# Patient Record
Sex: MALE | Race: BLACK OR AFRICAN AMERICAN | NOT HISPANIC OR LATINO | Employment: UNEMPLOYED | URBAN - METROPOLITAN AREA
[De-identification: names, ages, dates, MRNs, and addresses within clinical notes are randomized per-mention and may not be internally consistent; named-entity substitution may affect disease eponyms.]

---

## 2020-05-20 ENCOUNTER — HOSPITAL ENCOUNTER (EMERGENCY)
Facility: CLINIC | Age: 50
Discharge: HOME | End: 2020-05-21
Attending: EMERGENCY MEDICINE | Admitting: EMERGENCY MEDICINE
Payer: MEDICAID

## 2020-05-20 DIAGNOSIS — R03.0 ELEVATED BP WITHOUT DIAGNOSIS OF HYPERTENSION: ICD-10-CM

## 2020-05-20 DIAGNOSIS — R94.31 NONSPECIFIC ABNORMAL ELECTROCARDIOGRAM (ECG) (EKG): ICD-10-CM

## 2020-05-20 LAB
ANION GAP SERPL CALCULATED.3IONS-SCNC: 6 MMOL/L (ref 3–14)
BASOPHILS # BLD AUTO: 0.1 10E9/L (ref 0–0.2)
BASOPHILS NFR BLD AUTO: 0.6 %
BUN SERPL-MCNC: 11 MG/DL (ref 7–30)
CALCIUM SERPL-MCNC: 9.2 MG/DL (ref 8.5–10.1)
CHLORIDE SERPL-SCNC: 107 MMOL/L (ref 94–109)
CO2 SERPL-SCNC: 24 MMOL/L (ref 20–32)
CREAT SERPL-MCNC: 0.87 MG/DL (ref 0.66–1.25)
DIFFERENTIAL METHOD BLD: ABNORMAL
EOSINOPHIL # BLD AUTO: 0.4 10E9/L (ref 0–0.7)
EOSINOPHIL NFR BLD AUTO: 4.7 %
ERYTHROCYTE [DISTWIDTH] IN BLOOD BY AUTOMATED COUNT: 15.3 % (ref 10–15)
GFR SERPL CREATININE-BSD FRML MDRD: >90 ML/MIN/{1.73_M2}
GLUCOSE SERPL-MCNC: 234 MG/DL (ref 70–99)
HCT VFR BLD AUTO: 43.1 % (ref 40–53)
HGB BLD-MCNC: 13.2 G/DL (ref 13.3–17.7)
IMM GRANULOCYTES # BLD: 0.1 10E9/L (ref 0–0.4)
IMM GRANULOCYTES NFR BLD: 0.6 %
LYMPHOCYTES # BLD AUTO: 3.1 10E9/L (ref 0.8–5.3)
LYMPHOCYTES NFR BLD AUTO: 34.1 %
MCH RBC QN AUTO: 22.2 PG (ref 26.5–33)
MCHC RBC AUTO-ENTMCNC: 30.6 G/DL (ref 31.5–36.5)
MCV RBC AUTO: 72 FL (ref 78–100)
MONOCYTES # BLD AUTO: 0.8 10E9/L (ref 0–1.3)
MONOCYTES NFR BLD AUTO: 8.9 %
NEUTROPHILS # BLD AUTO: 4.6 10E9/L (ref 1.6–8.3)
NEUTROPHILS NFR BLD AUTO: 51.1 %
NRBC # BLD AUTO: 0 10*3/UL
NRBC BLD AUTO-RTO: 0 /100
PLATELET # BLD AUTO: 303 10E9/L (ref 150–450)
POTASSIUM SERPL-SCNC: 4 MMOL/L (ref 3.4–5.3)
RBC # BLD AUTO: 5.95 10E12/L (ref 4.4–5.9)
SODIUM SERPL-SCNC: 137 MMOL/L (ref 133–144)
TROPONIN I SERPL-MCNC: <0.015 UG/L (ref 0–0.04)
WBC # BLD AUTO: 9.1 10E9/L (ref 4–11)

## 2020-05-20 PROCEDURE — 80048 BASIC METABOLIC PNL TOTAL CA: CPT | Performed by: EMERGENCY MEDICINE

## 2020-05-20 PROCEDURE — 99285 EMERGENCY DEPT VISIT HI MDM: CPT

## 2020-05-20 PROCEDURE — 93005 ELECTROCARDIOGRAM TRACING: CPT

## 2020-05-20 PROCEDURE — 84484 ASSAY OF TROPONIN QUANT: CPT | Performed by: EMERGENCY MEDICINE

## 2020-05-20 PROCEDURE — 85025 COMPLETE CBC W/AUTO DIFF WBC: CPT | Performed by: EMERGENCY MEDICINE

## 2020-05-20 PROCEDURE — 93005 ELECTROCARDIOGRAM TRACING: CPT | Mod: 76

## 2020-05-20 ASSESSMENT — ENCOUNTER SYMPTOMS
CHEST TIGHTNESS: 0
SHORTNESS OF BREATH: 0

## 2020-05-20 ASSESSMENT — MIFFLIN-ST. JEOR: SCORE: 2184.41

## 2020-05-20 NOTE — ED AVS SNAPSHOT
[facility] Emergency Department  [address]  [facility] [address]  Phone:  [phone]  Fax:  [phone]                                    Dayan Rodriguez   MRN: [id number]    Department:  [facility] Emergency Department   Date of Visit:  5/20/2020           After Visit Summary Signature Page    I have received my discharge instructions, and my questions have been answered. I have discussed any challenges I see with this plan with the nurse or doctor.    ..........................................................................................................................................  Patient/Patient Representative Signature      ..........................................................................................................................................  Patient Representative Print Name and Relationship to Patient    ..................................................               ................................................  Date                                   Time    ..........................................................................................................................................  Reviewed by Signature/Title    ...................................................              ..............................................  Date                                               Time          22EPIC Rev 08/18

## 2020-05-21 VITALS
RESPIRATION RATE: 18 BRPM | WEIGHT: 272 LBS | HEIGHT: 75 IN | SYSTOLIC BLOOD PRESSURE: 145 MMHG | OXYGEN SATURATION: 100 % | BODY MASS INDEX: 33.82 KG/M2 | HEART RATE: 73 BPM | TEMPERATURE: 98.4 F | DIASTOLIC BLOOD PRESSURE: 106 MMHG

## 2020-05-21 LAB
INTERPRETATION ECG - MUSE: NORMAL
INTERPRETATION ECG - MUSE: NORMAL
TROPONIN I SERPL-MCNC: <0.015 UG/L (ref 0–0.04)

## 2020-05-21 NOTE — ED TRIAGE NOTES
Presents with concerns of hypertension. States 208/140 at 2100 at home. Denies any symptoms. A&O x 4, ABC's intact.

## 2020-05-21 NOTE — ED PROVIDER NOTES
History     Chief Complaint:  Hypertension    HPI   Dayan Rodriguez is a 49 year old male who presents to the emergency department for evaluation of hypertension. The patient went for a job interview today and had a physical exam performed where his blood pressure was taken 3 times and elevated each time at 176/101, 168/88, 188/106. He took his blood pressure again this evening and had a reading of 208/140, prompting his visit to the emergency department tonight.  He has a blood pressure cuff at home and typically gets readings of 150-160/. He does not regularly follow with a primary care provider. He denies chest pain, chest pressure, or shortness of breath.  He denies vision changes, headache, slurred speech, nor focal neurologic deficits.  No other concerns are voiced at this time.    CARDIAC RISK FACTORS:  Sex:    male  Tobacco:   no  Hypertension:   yes  Hyperlipidemia:  no  Diabetes:   no  Family History:  yes    PE/DVT RISK FACTORS:  Sex:    male  Hormones:   no  Tobacco:   no  Cancer:   no  Travel:   no  Surgery:   no  Other immobilization: no  Personal history:  no  Family history:  no    Allergies:  No Known Drug Allergies     Medications:    The patient is not currently taking any prescribed medications.    Past Medical History:    The patient denies any significant past medical history.    Past Surgical History:    The patient does not have any pertinent past surgical history.    Family History:  Parent  of stroke in 50s.    Social History:  Smoking Status: Former Smoker, quit 5 yrs prior  Smokeless Tobacco: Never Used  Alcohol Use: Yes, socially and rare  Drug Use: No     Review of Systems   Respiratory: Negative for chest tightness and shortness of breath.    Cardiovascular: Negative for chest pain.   All other systems reviewed and are negative.      Physical Exam     Patient Vitals for the past 24 hrs:   BP Temp Temp src Pulse Heart Rate Resp SpO2 Height Weight   20 2230 (!) 149/103 --  "-- 77 77 -- 100 % -- --   05/20/20 2215 (!) 152/101 -- -- 76 78 -- 100 % -- --   05/20/20 2200 (!) 156/102 -- -- 76 80 -- 100 % -- --   05/20/20 2145 (!) 163/98 -- -- 74 76 -- 100 % -- --   05/20/20 2128 (!) 171/114 98.4  F (36.9  C) Oral 73 73 18 98 % 1.905 m (6' 3\") 123.4 kg (272 lb)       Physical Exam    General:              Well-nourished              Speaking in full sentences    Well appearing, resting comfortably on gurney  Eyes:              Conjunctiva without injection or scleral icterus  ENT:              Moist mucous membranes              Nares patent              Pinnae normal  Neck:              Full ROM              No stiffness appreciated  Resp:              Lungs CTAB              No crackles, wheezing or audible rubs              Good air movement  CV:                    Normal rate, regular rhythm              S1 and S2 present              No murmur, gallop or rub  GI:              BS present              Abdomen soft without distention              Non-tender to light and deep palpation              No guarding or rebound tenderness  Skin:              Warm, dry, well perfused              No rashes or open wounds on exposed skin  MSK:              Moves all extremities              No focal deformities or swelling  Neuro:              Alert              Answers questions appropriately              Moves all extremities equally              Gait stable  Psych:              Normal affect, normal mood    Emergency Department Course     ECG:  ECG taken at 2212, ECG read at 2213  Normal sinus rhythm  Nonspecific T wave abnormality   Abnormal ECG  Rate 72 bpm. VT interval 182 ms. QRS duration 100 ms. QT/QTc 356/389 ms. P-R-T axes 35 -5 33.    ECG:  ECG taken at 2257, ECG read at 2258  Sinus rhythm  Non-specific intra-ventricular conduction delay  Nonspecific T wave abnormality  Abnormal ECG  No significant change compared to EKG dated 05/20/2020.    Rate 64 bpm. VT interval 186 ms. QRS duration " 122 ms. QT/QTc 364/375 ms. P-R-T axes 30 -13 32.    Laboratory:  Laboratory findings were communicated with the patient who voiced understanding of the findings.    Troponin(Collected 21:44): <0.015  Troponin(Collected 23:47): <0.015    BMP: Glucose 234 (H), o/w WNL (Creatinine: 0.87)    CBC: WBC: 9.1, HGB: 13.2 (L), PLT: 303     Emergency Department Course:  Past medical records, nursing notes, and vitals reviewed.    2134 I performed an exam of the patient as documented above.     2212 EKG obtained in the ED, see results above.     2144 IV was inserted and blood was drawn for laboratory testing, results above.    2332 I consulted with Dr. Welsh, with cardiology, regarding the patient's history and presentation here in the emergency department.     2345 I rechecked the patient and discussed the results of his workup thus far.     Findings and plan explained to the Patient. Patient discharged home with instructions regarding supportive care, medications, and reasons to return. The importance of close follow-up was reviewed.     I personally reviewed the laboratory and imaging results with the Patient and answered all related questions prior to discharge.     Impression & Plan     Medical Decision Making:  Dayan Rodriguez is a 49-year-old male presenting to the emergency department with concerns regarding high blood pressure.  VS on presentation reveal a BP of 171/114, which improved during his ED course without need for medication intervention.  By history, patient denies experiencing symptoms suggestive of endorgan damage.  Specifically, he denies headache, confusion, visual changes, or focal neurologic deficits to suggest cerebral ischemia or hypertensive encephalopathy.  Additionally, he denies symptoms of chest pain, chest pressure, nor shortness of breath to suggest ACS.  Screening EKG was performed, demonstrating mild ST segment elevation in lead V2, as well as T wave inversion in the lateral precordial leads.   No previous EKGs are available for comparison.  No significant changes are noted on repeat EKG during his ED course.  In light of these findings, I reviewed the EKGs with Dr. Welsh of cardiology.  He did not feel this represented acute ischemia, especially in the setting of no symptoms and negative troponin testing.  He felt this was most likely related to findings of underlying hypertension.  Patient's labs otherwise reveal unremarkable renal function.  Blood sugar is elevated, though this was not a fasting sample and patient notes eating at [employer] just prior to coming to the ED.  I stressed the importance of close follow-up and establishing care with a primary care provider.  Referral information was provided.  The patient was encouraged to keep a log of blood pressure readings at home to follow-up with his PCP and determine if medication intervention is required.  Given his improvements in blood pressure during his ED course, I do not feel this needs to be initiated emergently from the ED.  Patient verbalized understanding of this.  He is encouraged to monitor symptoms closely and return immediately should he develop chest pain, chest pressure, shortness of breath, persistently elevated BP >180/100 or any other new or troubling symptoms. We reviewed follow-up plan with wife prior to discharge, and patient does have an appointment already scheduled with a PCP tomorrow at 11:30 am.  All questions were answered prior to discharge.    Diagnosis:    ICD-10-CM    1. Elevated BP without diagnosis of hypertension  R03.0    2. Nonspecific abnormal electrocardiogram (ECG) (EKG)  R94.31        Disposition:  Discharged to home.    Scribe Disclosure:  Charlotte HOLM, am serving as a scribe at 9:40 PM on 5/20/2020 to document services personally performed by Angel Sepulveda MD based on my observations and the provider's statements to me.        Angel Sepulveda MD  05/21/20 [id number]

## 2022-11-30 ENCOUNTER — HOSPITAL ENCOUNTER (EMERGENCY)
Facility: CLINIC | Age: 52
Discharge: HOME | End: 2022-11-30
Attending: EMERGENCY MEDICINE | Admitting: EMERGENCY MEDICINE
Payer: COMMERCIAL

## 2022-11-30 ENCOUNTER — APPOINTMENT (OUTPATIENT)
Dept: CT IMAGING | Facility: CLINIC | Age: 52
End: 2022-11-30
Attending: EMERGENCY MEDICINE
Payer: COMMERCIAL

## 2022-11-30 VITALS
DIASTOLIC BLOOD PRESSURE: 105 MMHG | TEMPERATURE: 99.1 F | BODY MASS INDEX: 32.25 KG/M2 | SYSTOLIC BLOOD PRESSURE: 158 MMHG | RESPIRATION RATE: 22 BRPM | HEART RATE: 78 BPM | OXYGEN SATURATION: 95 % | WEIGHT: 258 LBS

## 2022-11-30 DIAGNOSIS — J98.4 INFLAMMATION OF LUNG: ICD-10-CM

## 2022-11-30 DIAGNOSIS — J18.9 ATYPICAL PNEUMONIA: ICD-10-CM

## 2022-11-30 LAB
ALBUMIN UR-MCNC: NEGATIVE MG/DL
ANION GAP SERPL CALCULATED.3IONS-SCNC: 11 MMOL/L (ref 7–15)
APPEARANCE UR: CLEAR
ATRIAL RATE - MUSE: 93 BPM
BACTERIA SPT CULT: NORMAL
BASE EXCESS BLDV CALC-SCNC: 3.9 MMOL/L (ref -7.7–1.9)
BASOPHILS # BLD AUTO: 0.1 10E3/UL (ref 0–0.2)
BASOPHILS NFR BLD AUTO: 1 %
BILIRUB UR QL STRIP: NEGATIVE
BUN SERPL-MCNC: 11.4 MG/DL (ref 6–20)
CALCIUM SERPL-MCNC: 9.9 MG/DL (ref 8.6–10)
CHLORIDE SERPL-SCNC: 97 MMOL/L (ref 98–107)
COLOR UR AUTO: ABNORMAL
CREAT SERPL-MCNC: 1.34 MG/DL (ref 0.67–1.17)
DEPRECATED HCO3 PLAS-SCNC: 27 MMOL/L (ref 22–29)
DIASTOLIC BLOOD PRESSURE - MUSE: NORMAL MMHG
EOSINOPHIL # BLD AUTO: 1.2 10E3/UL (ref 0–0.7)
EOSINOPHIL NFR BLD AUTO: 12 %
ERYTHROCYTE [DISTWIDTH] IN BLOOD BY AUTOMATED COUNT: 14 % (ref 10–15)
FLUAV RNA SPEC QL NAA+PROBE: NEGATIVE
FLUBV RNA RESP QL NAA+PROBE: NEGATIVE
GFR SERPL CREATININE-BSD FRML MDRD: 64 ML/MIN/1.73M2
GLUCOSE SERPL-MCNC: 168 MG/DL (ref 70–99)
GLUCOSE UR STRIP-MCNC: NEGATIVE MG/DL
GRAM STAIN RESULT: NORMAL
HCO3 BLDV-SCNC: 29 MMOL/L (ref 21–28)
HCT VFR BLD AUTO: 40.2 % (ref 40–53)
HGB BLD-MCNC: 12.4 G/DL (ref 13.3–17.7)
HGB UR QL STRIP: ABNORMAL
IMM GRANULOCYTES # BLD: 0.1 10E3/UL
IMM GRANULOCYTES NFR BLD: 1 %
INTERPRETATION ECG - MUSE: NORMAL
KETONES UR STRIP-MCNC: NEGATIVE MG/DL
L PNEUMO1 AG UR QL IA: NEGATIVE
LEUKOCYTE ESTERASE UR QL STRIP: ABNORMAL
LYMPHOCYTES # BLD AUTO: 1.6 10E3/UL (ref 0.8–5.3)
LYMPHOCYTES NFR BLD AUTO: 15 %
MCH RBC QN AUTO: 22.6 PG (ref 26.5–33)
MCHC RBC AUTO-ENTMCNC: 30.8 G/DL (ref 31.5–36.5)
MCV RBC AUTO: 73 FL (ref 78–100)
MONOCYTES # BLD AUTO: 0.7 10E3/UL (ref 0–1.3)
MONOCYTES NFR BLD AUTO: 7 %
NEUTROPHILS # BLD AUTO: 6.6 10E3/UL (ref 1.6–8.3)
NEUTROPHILS NFR BLD AUTO: 64 %
NITRATE UR QL: NEGATIVE
NRBC # BLD AUTO: 0 10E3/UL
NRBC BLD AUTO-RTO: 0 /100
NT-PROBNP SERPL-MCNC: 6 PG/ML (ref 0–900)
O2/TOTAL GAS SETTING VFR VENT: 0 %
P AXIS - MUSE: 25 DEGREES
PCO2 BLDV: 45 MM HG (ref 40–50)
PH BLDV: 7.42 [PH] (ref 7.32–7.43)
PH UR STRIP: 5.5 [PH] (ref 5–7)
PLATELET # BLD AUTO: 313 10E3/UL (ref 150–450)
PO2 BLDV: 43 MM HG (ref 25–47)
POTASSIUM SERPL-SCNC: 3.6 MMOL/L (ref 3.4–5.3)
PR INTERVAL - MUSE: 164 MS
QRS DURATION - MUSE: 106 MS
QT - MUSE: 394 MS
QTC - MUSE: 489 MS
R AXIS - MUSE: -30 DEGREES
RBC # BLD AUTO: 5.49 10E6/UL (ref 4.4–5.9)
RBC URINE: 1 /HPF
RSV RNA SPEC NAA+PROBE: NEGATIVE
SARS-COV-2 RNA RESP QL NAA+PROBE: NEGATIVE
SODIUM SERPL-SCNC: 135 MMOL/L (ref 136–145)
SP GR UR STRIP: 1 (ref 1–1.03)
SYSTOLIC BLOOD PRESSURE - MUSE: NORMAL MMHG
T AXIS - MUSE: 25 DEGREES
TROPONIN T SERPL HS-MCNC: 19 NG/L
UROBILINOGEN UR STRIP-MCNC: NORMAL MG/DL
VENTRICULAR RATE- MUSE: 93 BPM
WBC # BLD AUTO: 10.2 10E3/UL (ref 4–11)
WBC URINE: 2 /HPF

## 2022-11-30 PROCEDURE — 87070 CULTURE OTHR SPECIMN AEROBIC: CPT | Performed by: EMERGENCY MEDICINE

## 2022-11-30 PROCEDURE — 85014 HEMATOCRIT: CPT | Performed by: EMERGENCY MEDICINE

## 2022-11-30 PROCEDURE — 36415 COLL VENOUS BLD VENIPUNCTURE: CPT | Performed by: EMERGENCY MEDICINE

## 2022-11-30 PROCEDURE — 71250 CT THORAX DX C-: CPT

## 2022-11-30 PROCEDURE — 87637 SARSCOV2&INF A&B&RSV AMP PRB: CPT | Performed by: EMERGENCY MEDICINE

## 2022-11-30 PROCEDURE — 99285 EMERGENCY DEPT VISIT HI MDM: CPT | Mod: 25

## 2022-11-30 PROCEDURE — 87086 URINE CULTURE/COLONY COUNT: CPT | Performed by: EMERGENCY MEDICINE

## 2022-11-30 PROCEDURE — 81001 URINALYSIS AUTO W/SCOPE: CPT | Performed by: EMERGENCY MEDICINE

## 2022-11-30 PROCEDURE — 80048 BASIC METABOLIC PNL TOTAL CA: CPT | Performed by: EMERGENCY MEDICINE

## 2022-11-30 PROCEDURE — 96374 THER/PROPH/DIAG INJ IV PUSH: CPT

## 2022-11-30 PROCEDURE — 82803 BLOOD GASES ANY COMBINATION: CPT | Performed by: EMERGENCY MEDICINE

## 2022-11-30 PROCEDURE — 87899 AGENT NOS ASSAY W/OPTIC: CPT | Performed by: EMERGENCY MEDICINE

## 2022-11-30 PROCEDURE — 83880 ASSAY OF NATRIURETIC PEPTIDE: CPT | Performed by: EMERGENCY MEDICINE

## 2022-11-30 PROCEDURE — 93005 ELECTROCARDIOGRAM TRACING: CPT

## 2022-11-30 PROCEDURE — C9803 HOPD COVID-19 SPEC COLLECT: HCPCS

## 2022-11-30 PROCEDURE — 84484 ASSAY OF TROPONIN QUANT: CPT | Performed by: EMERGENCY MEDICINE

## 2022-11-30 PROCEDURE — 250N000011 HC RX IP 250 OP 636: Performed by: EMERGENCY MEDICINE

## 2022-11-30 RX ORDER — METHYLPREDNISOLONE SODIUM SUCCINATE 125 MG/2ML
125 INJECTION, POWDER, LYOPHILIZED, FOR SOLUTION INTRAMUSCULAR; INTRAVENOUS ONCE
Status: COMPLETED | OUTPATIENT
Start: 2022-11-30 | End: 2022-11-30

## 2022-11-30 RX ORDER — AZITHROMYCIN 250 MG/1
TABLET, FILM COATED ORAL
Qty: 6 TABLET | Refills: 0 | Status: SHIPPED | OUTPATIENT
Start: 2022-11-30 | End: 2022-12-05

## 2022-11-30 RX ORDER — PREDNISONE 20 MG/1
TABLET ORAL
Qty: 10 TABLET | Refills: 0 | Status: SHIPPED | OUTPATIENT
Start: 2022-11-30

## 2022-11-30 RX ADMIN — METHYLPREDNISOLONE SODIUM SUCCINATE 125 MG: 125 INJECTION, POWDER, FOR SOLUTION INTRAMUSCULAR; INTRAVENOUS at 09:07

## 2022-11-30 ASSESSMENT — ENCOUNTER SYMPTOMS
CHILLS: 0
NAUSEA: 1
ABDOMINAL PAIN: 1
SHORTNESS OF BREATH: 1
COUGH: 1
FREQUENCY: 1
BLOOD IN STOOL: 0
VOMITING: 1
FEVER: 0

## 2022-11-30 ASSESSMENT — ACTIVITIES OF DAILY LIVING (ADL)
ADLS_ACUITY_SCORE: 35

## 2022-11-30 NOTE — Clinical Note
Dayan Rodriguez was seen and treated in our emergency department on 11/30/2022.  He may return to work on 12/03/2022.       If you have any questions or concerns, please don't hesitate to call.      Masters-Amaya Jones, RN

## 2022-11-30 NOTE — ED TRIAGE NOTES
Pt arrives via EMS, EMS reports that pt comes from work where he started having worsening SOB. PT has been on prednisone for 2.5 months due to this chronic SOB and also doing nebs at home. Pt VSS upon EMS arrival but they reported pt lung sounds were tight, 3 albuterol nebs and 1 duo neb given en route. PT reports improvement after. PT VSS and ABC's intact

## 2022-11-30 NOTE — ED PROVIDER NOTES
Signed out by Dr. Vargas, refer to his notes for details.  Medically complex patient.    Urinalysis pending.  Sputum culture should be sent.  Antibiotics to cover for groundglass opacities already prescribed.    Discussed urine findings with patient, no convincing UTI with only 1 WBC.  Culture pending due to reflex order with +LE.  Confirmed with him that sputum sample was collected.    Discharged with follow-up as discussed by Dr. Vargas.         Fannie Grubbs MD  11/30/22 2315

## 2022-11-30 NOTE — ED PROVIDER NOTES
History   Chief Complaint:  Shortness of Breath       The history is provided by the patient and the spouse.      Dayan Rodriguez is a 52 year old male, 3 months s/p renal stent replacement, with history of diabetes mellitus, hypertension, and retroperitoneal fibromatosis who presents via EMS with shortness of breath, present chronically for 2 months, worsened this morning. He was seen at Urgent Care a week ago for the same symptoms, where he was given prednisone. He also reports a productive cough, some wheezing, some abdominal pain with cough, and one bout of emesis this morning following albuterol nebulizer treatment. Reports urinary frequency of about every 15 min for the past two weeks. His wife also notes swelling of lower extremities, worse in the left leg. He reports exacerbation of shortness of breath with exertion, including walking short distances within the house, and going outside. He denies chest pain. Denies fever and chills. Denies nausea and vomiting in ED. Denies black stool or blood in stool. He denies diagnosis with asthma or bronchitis. His wife notes Hx of stomach fibroid, with prednisone treatment for last 2.5 years, ending about a month ago. She reports he is due for a renal stent replacement.     Review of Systems   Constitutional: Negative for chills and fever.   Respiratory: Positive for cough and shortness of breath.    Cardiovascular: Positive for leg swelling. Negative for chest pain.   Gastrointestinal: Positive for abdominal pain, nausea (resolved) and vomiting (x1 this AM). Negative for blood in stool.   Genitourinary: Positive for frequency.   All other systems reviewed and are negative.    Allergies:  Ibuprofen  Naproxen    Medications:  Lisinopril  Atrovent  Xonpenex  Wellbutrin XL  Norvasc  Lipitor  Revatio  Cellcept  Maxzide-25  Metformin  Bactrim D5  Prednisone  Albuterol HFA    Past Medical History:     Retroperitoneal fibromatosis  Hydronephrosis with ureteral stricture,  left  Depression and anxiety  Chronic use of systemic steroids  Fatty liver  Diabetes mellitus, type 2  Hyperlipidemia   Hypertension   ED  Arthritis of right hip  Post-traumatic osteoarthritis of left knee  Bronchospasm  Bunion, left  Hammer toe left foot  Elevated ferritin   Tinnitus  Tobacco use    Past Surgical History:    Bunionectomy  Hammer toe repair     Family History:    Father- stroke  Mother- ovarian cancer  Brother- heart disease    Social History:  The patient presents to the ED with his wife.  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  PCP: No Ref-Primary, Physician     Physical Exam     Patient Vitals for the past 24 hrs:   BP Temp Pulse Resp SpO2 Weight   11/30/22 1105 (!) 158/105 -- 78 22 95 % --   11/30/22 1050 (!) 156/104 -- 72 13 96 % --   11/30/22 1035 (!) 151/106 -- 72 14 96 % --   11/30/22 1020 (!) 155/103 -- -- -- -- --   11/30/22 1005 (!) 182/112 -- -- -- 97 % --   11/30/22 0952 (!) 177/114 -- 73 -- 97 % --   11/30/22 0914 (!) 176/107 -- -- -- 94 % --   11/30/22 0759 (!) 166/97 -- -- -- -- 117 kg (258 lb)   11/30/22 0756 -- 99.1  F (37.3  C) 91 20 97 % --       Physical Exam  General: Patient is awake, alert and interactive when I enter the room  Head: The scalp, face, and head appear normal  Eyes: The pupils are equal, round, and reactive to light. Conjunctivae and sclerae are normal  ENT: External acoustic canals are normal. The oropharynx is normal without erythema. Uvula is in the midline  Neck: Normal range of motion.   CV: Regular rate and rhythm.   Resp: wheezing in multiple lung fields, no tachypnea or increased work of breathing   GI: Abdomen is soft, no rigidity, guarding, or rebound. No distension. No tenderness to palpation in any quadrant.     MS: Normal tone. Joints grossly normal without effusions. No asymmetric leg swelling, calf or thigh tenderness.    Skin: No rash or lesions noted. Normal capillary refill noted  Neuro: Speech is  normal and fluent. Face is symmetric. Moving all extremities.   Psych:  Normal affect.  Appropriate interactions.    Emergency Department Course    ECG  ECG taken at 0805, ECG read at 0832  Normal sinus rhythm. Left axis deviation. Prolonged QT. Abnormal ECG.   No significant change as compared to prior, dated 5/20/2020.  Rate 93 bpm. WA interval 164 ms. QRS duration 106 ms. QT/QTc 394/489 ms. P-R-T axes 25 -30 25.     Imaging:  Chest CT w/o contrast   Preliminary Result   IMPRESSION:    1.  Focal groundglass consolidation at the right upper lobe is   identified. This could represent an area of atypical pneumonia versus   inflammatory lung disease. Neoplasm is not entirely excluded. See   below for follow up imaging guidelines.   2.  Bilateral peribronchial wall thickening and mucus impaction   suggesting a bronchitis.   3.  Mild coronary artery calcifications.      Nodule Type:   Solitary pure GGN = or > 6mm: CT at 6-12 months to confirm   persistence, then CT every 2 years until 5 years.      *GGN = Groundglass nodule (subsolid nodule).   *Recommendations based on Guidelines for the Management of Incidental   Pulmonary Nodules Detected at CT: From the Fleischner Society 2017,   Radiology 2017.         Report per radiology    Laboratory:  Labs Ordered and Resulted from Time of ED Arrival to Time of ED Departure   BASIC METABOLIC PANEL - Abnormal       Result Value    Sodium 135 (*)     Potassium 3.6      Chloride 97 (*)     Carbon Dioxide (CO2) 27      Anion Gap 11      Urea Nitrogen 11.4      Creatinine 1.34 (*)     Calcium 9.9      Glucose 168 (*)     GFR Estimate 64     BLOOD GAS VENOUS - Abnormal    pH Venous 7.42      pCO2 Venous 45      pO2 Venous 43      Bicarbonate Venous 29 (*)     Base Excess/Deficit (+/-) 3.9 (*)     FIO2 0     CBC WITH PLATELETS AND DIFFERENTIAL - Abnormal    WBC Count 10.2      RBC Count 5.49      Hemoglobin 12.4 (*)     Hematocrit 40.2      MCV 73 (*)     MCH 22.6 (*)     MCHC 30.8  (*)     RDW 14.0      Platelet Count 313      % Neutrophils 64      % Lymphocytes 15      % Monocytes 7      % Eosinophils 12      % Basophils 1      % Immature Granulocytes 1      NRBCs per 100 WBC 0      Absolute Neutrophils 6.6      Absolute Lymphocytes 1.6      Absolute Monocytes 0.7      Absolute Eosinophils 1.2 (*)     Absolute Basophils 0.1      Absolute Immature Granulocytes 0.1      Absolute NRBCs 0.0     TROPONIN T, HIGH SENSITIVITY - Normal    Troponin T, High Sensitivity 19     NT PROBNP INPATIENT - Normal    N terminal Pro BNP Inpatient 6     INFLUENZA A/B & SARS-COV2 PCR MULTIPLEX - Normal    Influenza A PCR Negative      Influenza B PCR Negative      RSV PCR Negative      SARS CoV2 PCR Negative     ROUTINE UA WITH MICROSCOPIC REFLEX TO CULTURE   LEGIONELLA PNEUMOPHILA URINARY ANTIGEN   RESPIRATORY AEROBIC BACTERIAL CULTURE        Emergency Department Course:    Reviewed:  I reviewed nursing notes, vitals, past medical history and Care Everywhere    Assessments:  0810 I obtained history and examined the patient as noted above.   1118 I rechecked the patient and explained findings. He was agreeable to discharge.    Consult:  Spoke with Pulmonology at the [facility] regarding presentation     Interventions:  0907 Solu-medrol 125 mg IV    Disposition:  The patient was discharged to home.     Impression & Plan   Medical Decision Making:  Dayan Rodriguez is a 52 year old male, 3 months s/p renal stent replacement, with history of diabetes mellitus, hypertension, and retroperitoneal fibromatosis who presents via EMS with shortness of breath, present chronically for 2 months, worsened this morning.  On initial evaluation, he is not hypoxic or displaying any evidence of respiratory compromise.  However he received 4 nebulizer treatments in route by EMS. He is afebrile.  In speaking with the patient and his wife this appears to be a chronic ongoing problem that is been gradually worsening over the  past several months.  He also had a admission approximately 1 year ago at [facility] where his groundglass opacities were worked up.  Ultimately there is was no firm diagnosis.  Patient is also been chronically on steroids for his retroperitoneal fibromatosis.  This followed by rheumatology.  Patient describes worsening dyspnea on exertion, shortness of breath at night and persistent wheezing despite using albuterol inhalers and a short course of steroids.  Presents here with worsening symptoms.  EKG was obtained which did not show any acute signs of ischemia nor dysrhythmia.  Troponin after days of symptoms is below the 99th percentile.  Very unlikely that this represents acute coronary syndrome.  No evidence on work-up or clinical exam to suggest congestive heart failure.  COVID, influenza and RSV were all negative.  CT scan was obtained which shows groundglass opacities in the right upper lobe which could be atypical pneumonia versus obstructive or inflammatory lung disease.  There also appears to be some bronchial wall thickening which could be consistent with bronchitis.  Case was discussed with pulmonology who recommended adding on sputum culture and having the patient follow-up closely in pulmonology clinic.  Agrees with course of antibiotics to treat atypical pneumonia.  We will continue to use inhalers at home as previously prescribed.  Patient was given a prescription for prednisone but will discuss this with his rheumatologist as he does not want to interfere with his previously prescribed steroid taper.  At this point, but will need to follow-up closely with primary care and pulmonology.  I did not see any indication for inpatient hospitalization    Diagnosis:    ICD-10-CM    1. Atypical pneumonia  J18.9       2. Possible inflammatory lung disease  J18.9           Discharge Medications:  New Prescriptions    AMOXICILLIN-CLAVULANATE (AUGMENTIN) 875-125 MG TABLET    Take 1 tablet by mouth 2 times daily for  7 days    AZITHROMYCIN (ZITHROMAX) 250 MG TABLET    Take 2 tablets (500 mg) by mouth daily for 1 day, THEN 1 tablet (250 mg) daily for 4 days.    PREDNISONE (DELTASONE) 20 MG TABLET    Take two tablets (= 40mg) each day for 5 (five) days       Scribe Disclosure:  MIHAI, Nahomy Naranjo, am serving as a scribe at 8:02 AM on 11/30/2022 to document services personally performed by Abdon Vargas based on my observations and the provider's statements to me.          Abdon Vargas MD  11/30/22 3420

## 2022-11-30 NOTE — ED NOTES
Bed: ED09  Expected date: 11/30/22  Expected time: 7:35 AM  Means of arrival:   Comments:  [facility]

## 2022-12-01 NOTE — RESULT ENCOUNTER NOTE
[facility] Emergency Dept discharge antibiotic (if prescribed): Amoxicillin-Clavulanate (Augmentin) 875-125 mg PO tablet, 1 tablet by mouth 2 times daily for 7 days AND ZPak   Date of Rx (if applicable):  11/30/22  No changes in treatment per [facility] ED Lab Result Urine culture protocol.

## 2022-12-02 LAB — BACTERIA UR CULT: NO GROWTH
